# Patient Record
Sex: MALE | Race: AMERICAN INDIAN OR ALASKA NATIVE
[De-identification: names, ages, dates, MRNs, and addresses within clinical notes are randomized per-mention and may not be internally consistent; named-entity substitution may affect disease eponyms.]

---

## 2018-06-04 ENCOUNTER — HOSPITAL ENCOUNTER (OUTPATIENT)
Dept: HOSPITAL 5 - GIO | Age: 53
Discharge: HOME | End: 2018-06-04
Attending: INTERNAL MEDICINE
Payer: COMMERCIAL

## 2018-06-04 VITALS — SYSTOLIC BLOOD PRESSURE: 125 MMHG | DIASTOLIC BLOOD PRESSURE: 84 MMHG

## 2018-06-04 DIAGNOSIS — Z98.890: ICD-10-CM

## 2018-06-04 DIAGNOSIS — K31.7: Primary | ICD-10-CM

## 2018-06-04 DIAGNOSIS — K44.9: ICD-10-CM

## 2018-06-04 DIAGNOSIS — K92.1: ICD-10-CM

## 2018-06-04 DIAGNOSIS — K22.8: ICD-10-CM

## 2018-06-04 DIAGNOSIS — I10: ICD-10-CM

## 2018-06-04 DIAGNOSIS — K21.9: ICD-10-CM

## 2018-06-04 PROCEDURE — 88342 IMHCHEM/IMCYTCHM 1ST ANTB: CPT

## 2018-06-04 PROCEDURE — 43239 EGD BIOPSY SINGLE/MULTIPLE: CPT

## 2018-06-04 PROCEDURE — 88305 TISSUE EXAM BY PATHOLOGIST: CPT

## 2018-06-04 NOTE — DISCHARGE SUMMARY
Short Stay Discharge Plan


Activity: advance as tolerated


Weight Bearing Status: Weight Bear as Tolerated


Diet: regular


Follow up with: 


RONAK IVY MD [Primary Care Provider] - 7 Days

## 2018-06-04 NOTE — OPERATIVE REPORT
Operative Report


Operative Report: 


Date: 06/04/2018 


   


Operative Report:


 


Date of procedure: 06/04/2018





Procedure: Esophagogastroduodenoscopy with multiple mucosal biopsies





Attending physician: Bubba Stephenson MD





: Bubba Stephenson MD





Indication: Patient is a 52 year-old male who presents  with a history of 

epigastric pain heartburn and indigestion.   An upper endoscopy is done to 

assess patient so that treatment may be directed based on the findings.





Consent: Informed consent was obtained after advising the patient and family 

regarding nature of this procedure, its indications, potential benefits as well 

as possible complications including but not limited to bleeding perforation and 

adverse reaction to medication, infection as well as other cardiopulmonary 

complications.  An informed written and verbal consent was then obtained after 

due opportunity was provided for questions and answers.





Monitoring: Patient was monitored continuously with pulse oximetry and 

electrocardiographic recordings as well as blood pressure recordings.  Vital 

signs remained stable throughout this procedure with no untoward events.





Preoperative assessment: Patient was assessed immediately prior to this 

procedure for capacity to tolerate monitored anesthesia care and moderate 

sedation as well as general anesthesia.  Patient's ASA classification is 2, 

Mallampati class is 2, Hyomental distance is 3.





Instrument: [id number] video endoscope





Medications: Propofol given intravenously in divided doses.  For details please 

refer to anesthesia records.





Description of procedure: Patient was placed in the left lateral decubitus 

position after achieving sedation, the endoscope was introduced into the 

esophagus under direct vision.  It was then advanced beyond the esophagus into 

the stomach and then beyond the stomach into the duodenum and to the second 

portion of the duodenum.  It was subsequently withdrawn with careful inspection 

of all mucosal surfaces with the following findings.





Findings: Patient had an irregular Z line at 40 cm with a gastroesophageal 

junction ring that was not luminaly including.  There was a 2-3 cm sliding 

hiatal hernia seen on entry into the stomach There was mild erythema in the 

gastric antrum.  Biopsies of the antrum were obtained for histopathology.  The  

rest of the duodenum was normal to second portion.





Impression: Irregular Z line.


Gastroesophageal junction ring


Hiatal hernia.


Gastric antral erythema status post biopsies








Plan: Follow pathology report.


Monitoring patient symptomatically.


Direct additional treatment based on the pathology report.

## 2018-07-16 ENCOUNTER — HOSPITAL ENCOUNTER (OUTPATIENT)
Dept: HOSPITAL 5 - GIO | Age: 53
Discharge: HOME | End: 2018-07-16
Attending: INTERNAL MEDICINE
Payer: COMMERCIAL

## 2018-07-16 VITALS — SYSTOLIC BLOOD PRESSURE: 141 MMHG | DIASTOLIC BLOOD PRESSURE: 91 MMHG

## 2018-07-16 DIAGNOSIS — M19.90: ICD-10-CM

## 2018-07-16 DIAGNOSIS — K64.8: ICD-10-CM

## 2018-07-16 DIAGNOSIS — D12.3: Primary | ICD-10-CM

## 2018-07-16 DIAGNOSIS — Z86.010: ICD-10-CM

## 2018-07-16 DIAGNOSIS — K21.9: ICD-10-CM

## 2018-07-16 DIAGNOSIS — D12.2: ICD-10-CM

## 2018-07-16 DIAGNOSIS — I10: ICD-10-CM

## 2018-07-16 DIAGNOSIS — Z98.890: ICD-10-CM

## 2018-07-16 PROCEDURE — 88305 TISSUE EXAM BY PATHOLOGIST: CPT

## 2018-07-16 PROCEDURE — 45398 COLONOSCOPY W/BAND LIGATION: CPT

## 2018-07-16 PROCEDURE — 45384 COLONOSCOPY W/LESION REMOVAL: CPT

## 2018-07-16 NOTE — OPERATIVE REPORT
Operative Report


Operative Report: 





Date of procedure: 07/16/2018





Procedure: Colonoscopy with multiple hot biopsy polypectomies.


Hemorrhoidal band ligation.





Attending physician: Bubba Stephenson MD





: Bubba Stephenson MD





Indication: Patient is a 52-year-old male who presents with recurrent rectal 

bleeding and past history of colon polyps.  This procedure is done to evaluate 

patient so that treatment may be directed based on findings.





Consent: Informed consent was obtained after advising the patient and family 

regarding nature of this procedure, its indications, potential benefits as well 

as possible complications including but not limited to bleeding perforation and 

adverse reaction to medication, infection as well as other cardiopulmonary 

complications.  An informed written and verbal consent was then obtained after 

due opportunity was provided for questions and answers.





Monitoring: Patient was monitored continuously with pulse oximetry and 

electrocardiographic recordings as well as blood pressure recordings.  Vital 

signs remained stable throughout this procedure with no untoward events.





Preoperative assessment: Patient was assessed immediately prior to this 

procedure for capacity to tolerate monitored anesthesia care and moderate 

sedation as well as general anesthesia.  Patient's ASA classification is 2, 

Mallampati class is 2, Hyomental distance is 3.





Instrument: [employer] video colonoscope





Medications: Propofol given intravenously in divided doses.  For details please 

refer to anesthesia records.





Description of procedure: Patient was placed in the left lateral decubitus 

position after achieving sedation, a digital rectal examination was performed 

following which the colonoscope was introduced into the anal verge and advanced 

to the cecum which was identified by the cecal valve, the appendiceal orifice, 

as well as by the cecal strap and direct transillumination.  The colonoscope 

was subsequently withdrawn with careful inspection of all mucosal surfaces.  

Patient tolerated this procedure well and was subsequently taken to the 

recovery room.  The following findings were noted.





Findings: Patient had  a diminutive polyp in the ascending colon which was 

removed by hot biopsy polypectomy.  There also was another diminutive flat 

polyp in the transverse colon which again was removed by hot biopsy 

polypectomy.   The rest of the colon to the cecum was normal.  On the retroflex 

view at the anal verge, patient had  prominent internal hemorrhoids.  After 

removing the colonoscope, an upper endoscope was preloaded with multiband 

ligator and reinserted into the anal verge.  Lidocaine gel was applied for 

local analgesia.  Following this, hemorrhoidal band ligation was performed.  5 

bands were applied.  Patient tolerated procedure well.





Impression: Diminutive polyps status post hot biopsy polypectomy





Prominent Internal hemorrhoids status post hemorrhoidal band ligation.





Plan: Daily sitz baths


Anusol HC suppositories per rectu daily at bedtime


5% lidocaine ointment per rectum every 4 as needed.


MiraLAX 17 g in 8 ounce glass of water daily.


Follow-up in 2 week


High-fiber diet.

## 2018-07-16 NOTE — ANESTHESIA CONSULTATION
Anesthesia Consult and Med Hx


Date of service: 07/16/18





- Airway


Anesthetic Teeth Evaluation: Good


ROM Head & Neck: Adequate


Mental/Hyoid Distance: Adequate


Mallampati Class: Class III


Intubation Access Assessment: Possibly Difficult





- Pulmonary Exam


CTA: Yes





- Cardiac Exam


Anesthetic Concerns: irregular





- Pre-Operative Health Status


ASA Pre-Surgery Classification: ASA2


Proposed Anesthetic Plan: MAC





- Cardiovascular System


Hx Hypertension: Yes





- Gastrointestinal


Hx Gastroesophageal Reflux Disease: Yes